# Patient Record
(demographics unavailable — no encounter records)

---

## 2024-10-29 NOTE — HISTORY OF PRESENT ILLNESS
[I will adhere to the patient's wishes.] : I will adhere to the patient's wishes. [Time Spent: ___ minutes] : Time Spent: [unfilled] minutes [FreeTextEntry1] :  Patient is a 86-year-old female with past medical history of cardiomyopathy, hypertension, overactive bladder, moderately severe dementia 10/29/2024 Alzheimer's Disease: - Patient's daughter mentioned that the patient underwent a neuropsych evaluation which indicated severe Alzheimer's disease. - The patient's daughter noted that the patient's condition has been progressively worsening. - The patient has difficulty remembering her grandchildren's names. - The patient's daughter inquired about the possibility of the condition reversing or improving, but was informed that the disease will continue to progress.  Respiratory Symptoms: - The patient has been coughing for the past few days. - The patient denies having a fever or chills.  believes the cough may be due to allergies or dust.  #Urinary incontinence - The patient is currently taking solifenacin, prescribed by a urologist. - The patient's daughter was informed that solifenacin can have some effect on memory.  08/08/2024 Cognitive careplan checklist summary:  Memory assessment: MOCA 01/30  ADLs/IADLs: Done  PHQ-2, NPI-Q: Done Med rec:  pending as daughter does not have her medication list. Will call and provide details Home Safety: done Caregiver assessment: needs more help ACP documentation: will fill MOLST at next visit FAST score: 6D Decision-making capacity: absent for complex decisions  sometimes she hears things which are not there. She thinks her dead relatives are still living in the building.  ELENA CANSECO is a 86 year yo [place]  [place]  female is coming in today to establish care. Patient has PMHx as listed below Daughter Erin on phone   #CMO no MI has cardiologist Dr. Gregory Atorvastatin   #HTN amlodipine, valsartan,   #Overactive bladder solifinacin; wears depends   #Dementia last few years. She gets confused with date, place. EMT called, lobby wandering. lives alone. gets help sometimes from daughter. thinks that her dad is here. She is still driving. once in a while hears things which are not there.  on donepezil      Education: college Work: law firm records ETOH/Smoking: never smoked; no alcohol Weight loss/malnutrition: has lost about 20lb weight : ; 2 kids Immunization:  Screening: Depression screening: done Medication reconciliation:  Allergies: none     4M Geriatric Screening Tests   Based on The 4Ms While Caring for Older Adults, an evidence-based focus on the key areas of mentation, mobility, medications, and what matters most (a guide by the [employer] and the [employer])     Medications: -Anticholinergic burden:[ ]     Mobility:   -Chronic pain: no -Constipation: no -Urinary symptoms: yes; follows with urologist. saw in may    Frail Scale: 0/5   -Are you fatigued? n -Can you walk up one flight of stairs? n -Can you walk one block? y -Do you have more than 5 illnesses?n -Have you lost more than 5% of your weight in last 6 months? y       Mentation: -Hx of dementia:y -h/o delirium:y -Cognitive enhancing agents:y       Sleep: 12:30am -10am STOP-BANG Snoring:[ ] Tiredness:[ ] Observed Apnea:[ ] Pressure: High blood pressure[ ] BMI: higher than 35.[ ] Age: older than 50[ ] Neck Circumference: greater than 16 inches is considered a risk factor.[ ] Gender: Males[ ]     Matters Most     [FreeTextEntry4] :  Today  ELENA's daughters would like to discuss advance care planning.  We discussed the patient's desire for care and treatment if she were to face an end-of-life illness or an illness that affects her decision-making capabilities.  We also discussed what measures the patient would like to undergo if her heart were to stop beating and/or she is not able to breathe on her own. Who was present during the visit? patient, daughter Florin in person and daughter Janene over the phone What decisions were made? DNR with intubation only for reversible causes; would not want dialysis or feeding tube What forms were completed/given to the patient? completed MOLST form

## 2024-12-31 NOTE — HISTORY OF PRESENT ILLNESS
[PMH Reviewed and Updated] : past medical history reviewed and updated [PSH Reviewed and Updated] : past surgical history reviewed and updated [Family History Reviewed and Updated] : family history reviewed and updated [Medication and Allergies Reconciled] : medication and allergies reconciled [Retired] : retired from work [None] : The patient has no concerns about alcohol abuse [Never] : has never used illicit drugs [Compliant with medications] : compliant with medications [Adequate] : adequate [Patient reported hearing was abnormal] : Patient reported hearing was abnormal [Patient declined colon rectal/cancer screening] : Patient declined colon/rectal cancer screening [Patient declined breast sonogram] : Patient declined breast sonogram [No falls in past year] : Patient reported no falls in the past year [Completely Independent] : Completely independent. [Independent] : transferring/mobility [Full assistance needed] : Assistance needed managing medications [] : Assistance needed managing finances. [Smoke Detector] : smoke detector [NO] : No [Needs some help with ADLs] : need some help with ADLs [Does not drive] : does not drive [No history of falls] : no history of falls [Seatbelts] : seatbelts [Smoke Detectors] : smoke detectors [Carbon Monoxide Detector] : carbon monoxide detector [Bathroom Grab Bars] : bathroom grab bars [Advanced Directives Discussed] : discussed at today's visit [Patient Does not Have Full Capacity] : this patient does not have full decision making capacity for discussion of advance care planning [0] : 2) Feeling down, depressed, or hopeless: Not at all (0) [PHQ-2 Negative - No further assessment needed] : PHQ-2 Negative - No further assessment needed [I have developed a follow-up plan documented below in the note.] : I have developed a follow-up plan documented below in the note. [Moderate] : Stage: Moderate [Over the Past 2 Weeks, Have You Felt Down, Depressed, or Hopeless?] : 1.) Over the past 2 weeks, have you felt down, depressed, or hopeless? No [Over the Past 2 Weeks, Have You Felt Little Interest or Pleasure Doing Things?] : 2.) Over the past 2 weeks, have you felt little interest or pleasure doing things? No [de-identified] : 1 daughter; no one else living with her [FreeTextEntry1] :  Patient is a 86-year-old female with past medical history of cardiomyopathy, hypertension, overactive bladder, moderately severe dementia 12/31/2024   - Summary: Patient presented for her annual wellness visit. She reports that overall, she is doing well and has no new symptoms or issues. There was a discussion about medication regimens, vaccinations, and concerns about memory issues.  10/29/2024 Alzheimer's Disease: - Patient's daughter mentioned that the patient underwent a neuropsych evaluation which indicated severe Alzheimer's disease. - The patient's daughter noted that the patient's condition has been progressively worsening. - The patient has difficulty remembering her grandchildren's names. - The patient's daughter inquired about the possibility of the condition reversing or improving, but was informed that the disease will continue to progress.  Respiratory Symptoms: - The patient has been coughing for the past few days. - The patient denies having a fever or chills.  believes the cough may be due to allergies or dust.  #Urinary incontinence - The patient is currently taking solifenacin, prescribed by a urologist. - The patient's daughter was informed that solifenacin can have some effect on memory.  08/08/2024 Cognitive careplan checklist summary:  Memory assessment: MOCA 01/30  ADLs/IADLs: Done  PHQ-2, NPI-Q: Done Med rec:  pending as daughter does not have her medication list. Will call and provide details Home Safety: done Caregiver assessment: needs more help ACP documentation: will fill MOLST at next visit FAST score: 6D Decision-making capacity: absent for complex decisions  sometimes she hears things which are not there. She thinks her dead relatives are still living in the building.  ELENA CANSECO is a 86 year yo [place] or  [place]  female is coming in today to establish care. Patient has PMHx as listed below Daughter Erin on phone   #CMO no MI has cardiologist Dr. Gregory Atorvastatin   #HTN amlodipine, valsartan,   #Overactive bladder solifinacin; wears depends   #Dementia last few years. She gets confused with date, place. EMT called, lobby wandering. lives alone. gets help sometimes from daughter. thinks that her dad is here. She is still driving. once in a while hears things which are not there.  on donepezil      Education: college Work: law firm records ETOH/Smoking: never smoked; no alcohol Weight loss/malnutrition: has lost about 20lb weight : ; 2 kids Immunization:  Screening: Depression screening: done Medication reconciliation:  Allergies: none     4M Geriatric Screening Tests   Based on The 4Ms While Caring for Older Adults, an evidence-based focus on the key areas of mentation, mobility, medications, and what matters most (a guide by the [place] [employer] and the [employer])     Medications: -Anticholinergic burden:[ ]     Mobility:   -Chronic pain: no -Constipation: no -Urinary symptoms: yes; follows with urologist. saw in may    Frail Scale: 0/5   -Are you fatigued? n -Can you walk up one flight of stairs? n -Can you walk one block? y -Do you have more than 5 illnesses?n -Have you lost more than 5% of your weight in last 6 months? y       Mentation: -Hx of dementia:y -h/o delirium:y -Cognitive enhancing agents:y       Sleep: 12:30am -10am STOP-BANG Snoring:[ ] Tiredness:[ ] Observed Apnea:[ ] Pressure: High blood pressure[ ] BMI: higher than 35.[ ] Age: older than 50[ ] Neck Circumference: greater than 16 inches is considered a risk factor.[ ] Gender: Males[ ]     Matters Most     [BreastSonogramDate] : 09/2024 [TextBox_25] : -1.2T; cont vitd and nelson [TextBox_31] : due [TextBox_37] : due [FreeTextEntry4] : deferred [Grab Bars] : no grab bars [Shower Chair] : no shower chair [Night Light] : no night light [FreeTextEntry8] : having urinary continence  [de-identified] : can answer phone; cannot use the intercom [de-identified] : buys things which she doesnt need anymore [de-identified] : reheating potato pancakes; premade [FreeTextEntry6] : not driving anymore since last month; license suspended  [[id number]] : 0

## 2025-03-31 NOTE — HISTORY OF PRESENT ILLNESS
[Family Member] : family member [Home] : at home, [unfilled] , at the time of the visit. [Medical Office: ([facility])___] : at the medical office located in  [Telehealth (audio & video)] : This visit was provided via telehealth using real-time 2-way audio visual technology. [FreeTextEntry3] : daughter Florin [FreeTextEntry1] :  Patient is a 86-year-old female with past medical history of cardiomyopathy, hypertension, overactive bladder, moderately severe dementia  03/31/2025    - History of Present Illness: Patient with a history of cognitive decline, witnessing more frequent episodes of memory lapse recently. She has a moderate hearing loss as indicated by her recent tests but has chosen not to wear hearing aids. Her vision tests were good, with only a need for reading glasses. There have been some recent changes in her sleep cycle. The patient also displayed inconsistencies in her recognition of her home where she has been living for 20years.  moderate hearing loss now on rivastigmine 13.3mg   12/31/2024   - Summary: Patient presented for her annual wellness visit. She reports that overall, she is doing well and has no new symptoms or issues. There was a discussion about medication regimens, vaccinations, and concerns about memory issues.  10/29/2024 Alzheimer's Disease: - Patient's daughter mentioned that the patient underwent a neuropsych evaluation which indicated severe Alzheimer's disease. - The patient's daughter noted that the patient's condition has been progressively worsening. - The patient has difficulty remembering her grandchildren's names. - The patient's daughter inquired about the possibility of the condition reversing or improving, but was informed that the disease will continue to progress.  Respiratory Symptoms: - The patient has been coughing for the past few days. - The patient denies having a fever or chills.  believes the cough may be due to allergies or dust.  #Urinary incontinence - The patient is currently taking solifenacin, prescribed by a urologist. - The patient's daughter was informed that solifenacin can have some effect on memory.  08/08/2024 Cognitive careplan checklist summary:  Memory assessment: MOCA 01/30  ADLs/IADLs: Done  PHQ-2, NPI-Q: Done Med rec:  pending as daughter does not have her medication list. Will call and provide details Home Safety: done Caregiver assessment: needs more help ACP documentation: will fill MOLST at next visit FAST score: 6D Decision-making capacity: absent for complex decisions  sometimes she hears things which are not there. She thinks her dead relatives are still living in the building.  ELENA CANSECO is a 86 year yo [place] or  [place]  female is coming in today to establish care. Patient has PMHx as listed below Daughter Erin on phone   #CMO no MI has cardiologist Dr. Gregory Atorvastatin   #HTN amlodipine, valsartan,   #Overactive bladder solifinacin; wears depends   #Dementia last few years. She gets confused with date, place. EMT called, lobby wandering. lives alone. gets help sometimes from daughter. thinks that her dad is here. She is still driving. once in a while hears things which are not there.  on donepezil      Education: college Work: law firm records ETOH/Smoking: never smoked; no alcohol Weight loss/malnutrition: has lost about 20lb weight : ; 2 kids Immunization:  Screening: Depression screening: done Medication reconciliation:  Allergies: none     4M Geriatric Screening Tests   Based on The 4Ms While Caring for Older Adults, an evidence-based focus on the key areas of mentation, mobility, medications, and what matters most (a guide by the [place] [employer] and the [employer])     Medications: -Anticholinergic burden:[ ]     Mobility:   -Chronic pain: no -Constipation: no -Urinary symptoms: yes; follows with urologist. saw in may    Frail Scale: 0/5   -Are you fatigued? n -Can you walk up one flight of stairs? n -Can you walk one block? y -Do you have more than 5 illnesses?n -Have you lost more than 5% of your weight in last 6 months? y       Mentation: -Hx of dementia:y -h/o delirium:y -Cognitive enhancing agents:y       Sleep: 12:30am -10am STOP-BANG Snoring:[ ] Tiredness:[ ] Observed Apnea:[ ] Pressure: High blood pressure[ ] BMI: higher than 35.[ ] Age: older than 50[ ] Neck Circumference: greater than 16 inches is considered a risk factor.[ ] Gender: Males[ ]     Matters Most